# Patient Record
Sex: FEMALE | Race: WHITE | NOT HISPANIC OR LATINO | Employment: UNEMPLOYED | URBAN - METROPOLITAN AREA
[De-identification: names, ages, dates, MRNs, and addresses within clinical notes are randomized per-mention and may not be internally consistent; named-entity substitution may affect disease eponyms.]

---

## 2018-09-07 ENCOUNTER — APPOINTMENT (OUTPATIENT)
Dept: RADIOLOGY | Facility: MEDICAL CENTER | Age: 48
End: 2018-09-07
Attending: EMERGENCY MEDICINE
Payer: OTHER GOVERNMENT

## 2018-09-07 ENCOUNTER — HOSPITAL ENCOUNTER (OUTPATIENT)
Facility: MEDICAL CENTER | Age: 48
End: 2018-09-08
Attending: EMERGENCY MEDICINE | Admitting: INTERNAL MEDICINE
Payer: OTHER GOVERNMENT

## 2018-09-07 DIAGNOSIS — I95.1 ORTHOSTATIC HYPOTENSION: ICD-10-CM

## 2018-09-07 DIAGNOSIS — R55 SYNCOPE, UNSPECIFIED SYNCOPE TYPE: ICD-10-CM

## 2018-09-07 PROBLEM — E87.6 HYPOKALEMIA: Status: ACTIVE | Noted: 2018-09-07

## 2018-09-07 PROBLEM — D64.9 NORMOCYTIC ANEMIA: Status: ACTIVE | Noted: 2018-09-07

## 2018-09-07 LAB
ALBUMIN SERPL BCP-MCNC: 3.9 G/DL (ref 3.2–4.9)
ALBUMIN/GLOB SERPL: 1.1 G/DL
ALP SERPL-CCNC: 64 U/L (ref 30–99)
ALT SERPL-CCNC: 14 U/L (ref 2–50)
ANION GAP SERPL CALC-SCNC: 7 MMOL/L (ref 0–11.9)
APTT PPP: 24.2 SEC (ref 24.7–36)
AST SERPL-CCNC: 15 U/L (ref 12–45)
BASOPHILS # BLD AUTO: 0.4 % (ref 0–1.8)
BASOPHILS # BLD: 0.04 K/UL (ref 0–0.12)
BILIRUB SERPL-MCNC: 0.2 MG/DL (ref 0.1–1.5)
BUN SERPL-MCNC: 15 MG/DL (ref 8–22)
CALCIUM SERPL-MCNC: 9.1 MG/DL (ref 8.5–10.5)
CHLORIDE SERPL-SCNC: 105 MMOL/L (ref 96–112)
CO2 SERPL-SCNC: 27 MMOL/L (ref 20–33)
CREAT SERPL-MCNC: 0.63 MG/DL (ref 0.5–1.4)
EKG IMPRESSION: NORMAL
EOSINOPHIL # BLD AUTO: 0.34 K/UL (ref 0–0.51)
EOSINOPHIL NFR BLD: 3.6 % (ref 0–6.9)
ERYTHROCYTE [DISTWIDTH] IN BLOOD BY AUTOMATED COUNT: 41.3 FL (ref 35.9–50)
FERRITIN SERPL-MCNC: 14.2 NG/ML (ref 10–291)
FOLATE SERPL-MCNC: 17.2 NG/ML
GLOBULIN SER CALC-MCNC: 3.4 G/DL (ref 1.9–3.5)
GLUCOSE SERPL-MCNC: 87 MG/DL (ref 65–99)
HCG SERPL QL: NEGATIVE
HCT VFR BLD AUTO: 32.4 % (ref 37–47)
HGB BLD-MCNC: 9.9 G/DL (ref 12–16)
IMM GRANULOCYTES # BLD AUTO: 0.03 K/UL (ref 0–0.11)
IMM GRANULOCYTES NFR BLD AUTO: 0.3 % (ref 0–0.9)
INR PPP: 1.06 (ref 0.87–1.13)
IRON SATN MFR SERPL: 10 % (ref 15–55)
IRON SERPL-MCNC: 39 UG/DL (ref 40–170)
LYMPHOCYTES # BLD AUTO: 2.25 K/UL (ref 1–4.8)
LYMPHOCYTES NFR BLD: 23.5 % (ref 22–41)
MCH RBC QN AUTO: 26.5 PG (ref 27–33)
MCHC RBC AUTO-ENTMCNC: 30.6 G/DL (ref 33.6–35)
MCV RBC AUTO: 86.6 FL (ref 81.4–97.8)
MONOCYTES # BLD AUTO: 0.58 K/UL (ref 0–0.85)
MONOCYTES NFR BLD AUTO: 6.1 % (ref 0–13.4)
NEUTROPHILS # BLD AUTO: 6.32 K/UL (ref 2–7.15)
NEUTROPHILS NFR BLD: 66.1 % (ref 44–72)
NRBC # BLD AUTO: 0 K/UL
NRBC BLD-RTO: 0 /100 WBC
PLATELET # BLD AUTO: 429 K/UL (ref 164–446)
PMV BLD AUTO: 8.4 FL (ref 9–12.9)
POTASSIUM SERPL-SCNC: 3.3 MMOL/L (ref 3.6–5.5)
PROT SERPL-MCNC: 7.3 G/DL (ref 6–8.2)
PROTHROMBIN TIME: 13.5 SEC (ref 12–14.6)
RBC # BLD AUTO: 3.74 M/UL (ref 4.2–5.4)
SODIUM SERPL-SCNC: 139 MMOL/L (ref 135–145)
TIBC SERPL-MCNC: 389 UG/DL (ref 250–450)
TROPONIN I SERPL-MCNC: <0.01 NG/ML (ref 0–0.04)
TSH SERPL DL<=0.005 MIU/L-ACNC: 1.95 UIU/ML (ref 0.38–5.33)
VIT B12 SERPL-MCNC: 408 PG/ML (ref 211–911)
WBC # BLD AUTO: 9.6 K/UL (ref 4.8–10.8)

## 2018-09-07 PROCEDURE — 84443 ASSAY THYROID STIM HORMONE: CPT

## 2018-09-07 PROCEDURE — 80053 COMPREHEN METABOLIC PANEL: CPT

## 2018-09-07 PROCEDURE — 93005 ELECTROCARDIOGRAM TRACING: CPT | Performed by: EMERGENCY MEDICINE

## 2018-09-07 PROCEDURE — 82728 ASSAY OF FERRITIN: CPT

## 2018-09-07 PROCEDURE — 83540 ASSAY OF IRON: CPT

## 2018-09-07 PROCEDURE — G0378 HOSPITAL OBSERVATION PER HR: HCPCS

## 2018-09-07 PROCEDURE — 93005 ELECTROCARDIOGRAM TRACING: CPT

## 2018-09-07 PROCEDURE — 84703 CHORIONIC GONADOTROPIN ASSAY: CPT

## 2018-09-07 PROCEDURE — 85730 THROMBOPLASTIN TIME PARTIAL: CPT

## 2018-09-07 PROCEDURE — 70450 CT HEAD/BRAIN W/O DYE: CPT

## 2018-09-07 PROCEDURE — 71045 X-RAY EXAM CHEST 1 VIEW: CPT

## 2018-09-07 PROCEDURE — 82746 ASSAY OF FOLIC ACID SERUM: CPT

## 2018-09-07 PROCEDURE — 85610 PROTHROMBIN TIME: CPT

## 2018-09-07 PROCEDURE — 84484 ASSAY OF TROPONIN QUANT: CPT

## 2018-09-07 PROCEDURE — 83550 IRON BINDING TEST: CPT

## 2018-09-07 PROCEDURE — 99220 PR INITIAL OBSERVATION CARE,LEVL III: CPT | Performed by: INTERNAL MEDICINE

## 2018-09-07 PROCEDURE — 700105 HCHG RX REV CODE 258: Performed by: INTERNAL MEDICINE

## 2018-09-07 PROCEDURE — 96360 HYDRATION IV INFUSION INIT: CPT

## 2018-09-07 PROCEDURE — 82607 VITAMIN B-12: CPT

## 2018-09-07 PROCEDURE — 85025 COMPLETE CBC W/AUTO DIFF WBC: CPT

## 2018-09-07 PROCEDURE — 99285 EMERGENCY DEPT VISIT HI MDM: CPT

## 2018-09-07 PROCEDURE — 700105 HCHG RX REV CODE 258: Performed by: EMERGENCY MEDICINE

## 2018-09-07 RX ORDER — ONDANSETRON 2 MG/ML
4 INJECTION INTRAMUSCULAR; INTRAVENOUS EVERY 4 HOURS PRN
Status: DISCONTINUED | OUTPATIENT
Start: 2018-09-07 | End: 2018-09-08 | Stop reason: HOSPADM

## 2018-09-07 RX ORDER — SODIUM CHLORIDE 9 MG/ML
1000 INJECTION, SOLUTION INTRAVENOUS ONCE
Status: COMPLETED | OUTPATIENT
Start: 2018-09-07 | End: 2018-09-07

## 2018-09-07 RX ORDER — POLYETHYLENE GLYCOL 3350 17 G/17G
1 POWDER, FOR SOLUTION ORAL
Status: DISCONTINUED | OUTPATIENT
Start: 2018-09-07 | End: 2018-09-08

## 2018-09-07 RX ORDER — SODIUM CHLORIDE 9 MG/ML
INJECTION, SOLUTION INTRAVENOUS CONTINUOUS
Status: DISCONTINUED | OUTPATIENT
Start: 2018-09-07 | End: 2018-09-08 | Stop reason: HOSPADM

## 2018-09-07 RX ORDER — ACETAMINOPHEN 325 MG/1
650 TABLET ORAL EVERY 6 HOURS PRN
Status: DISCONTINUED | OUTPATIENT
Start: 2018-09-07 | End: 2018-09-08 | Stop reason: HOSPADM

## 2018-09-07 RX ORDER — BISACODYL 10 MG
10 SUPPOSITORY, RECTAL RECTAL
Status: DISCONTINUED | OUTPATIENT
Start: 2018-09-07 | End: 2018-09-08

## 2018-09-07 RX ORDER — SODIUM CHLORIDE 9 MG/ML
INJECTION, SOLUTION INTRAVENOUS CONTINUOUS
Status: DISCONTINUED | OUTPATIENT
Start: 2018-09-07 | End: 2018-09-07

## 2018-09-07 RX ORDER — PROMETHAZINE HYDROCHLORIDE 25 MG/1
12.5-25 TABLET ORAL EVERY 4 HOURS PRN
Status: DISCONTINUED | OUTPATIENT
Start: 2018-09-07 | End: 2018-09-08

## 2018-09-07 RX ORDER — IPRATROPIUM BROMIDE AND ALBUTEROL SULFATE 2.5; .5 MG/3ML; MG/3ML
3 SOLUTION RESPIRATORY (INHALATION)
Status: DISCONTINUED | OUTPATIENT
Start: 2018-09-07 | End: 2018-09-07

## 2018-09-07 RX ORDER — ONDANSETRON 4 MG/1
4 TABLET, ORALLY DISINTEGRATING ORAL EVERY 4 HOURS PRN
Status: DISCONTINUED | OUTPATIENT
Start: 2018-09-07 | End: 2018-09-08 | Stop reason: HOSPADM

## 2018-09-07 RX ORDER — PROMETHAZINE HYDROCHLORIDE 25 MG/1
12.5-25 SUPPOSITORY RECTAL EVERY 4 HOURS PRN
Status: DISCONTINUED | OUTPATIENT
Start: 2018-09-07 | End: 2018-09-08

## 2018-09-07 RX ORDER — AMOXICILLIN 250 MG
2 CAPSULE ORAL 2 TIMES DAILY
Status: DISCONTINUED | OUTPATIENT
Start: 2018-09-08 | End: 2018-09-08

## 2018-09-07 RX ADMIN — SODIUM CHLORIDE: 9 INJECTION, SOLUTION INTRAVENOUS at 20:52

## 2018-09-07 RX ADMIN — SODIUM CHLORIDE 1000 ML: 9 INJECTION, SOLUTION INTRAVENOUS at 17:00

## 2018-09-07 ASSESSMENT — PAIN SCALES - GENERAL
PAINLEVEL_OUTOF10: 0
PAINLEVEL_OUTOF10: 0

## 2018-09-07 ASSESSMENT — ENCOUNTER SYMPTOMS
LOSS OF CONSCIOUSNESS: 1
NAUSEA: 0
VOMITING: 0
COUGH: 0
STRIDOR: 0
BACK PAIN: 0
MYALGIAS: 0
HEARTBURN: 0
HEADACHES: 0
SEIZURES: 0
EYE REDNESS: 0
NERVOUS/ANXIOUS: 0
SPUTUM PRODUCTION: 0
SHORTNESS OF BREATH: 0
FEVER: 0
DIARRHEA: 0
BLURRED VISION: 0
WEIGHT LOSS: 0
EYE DISCHARGE: 0
INSOMNIA: 0
CHILLS: 0
FOCAL WEAKNESS: 0
DIZZINESS: 0
NECK PAIN: 0
ORTHOPNEA: 0
EYE PAIN: 0
PALPITATIONS: 0
ABDOMINAL PAIN: 0
DEPRESSION: 0

## 2018-09-07 ASSESSMENT — PATIENT HEALTH QUESTIONNAIRE - PHQ9
2. FEELING DOWN, DEPRESSED, IRRITABLE, OR HOPELESS: NOT AT ALL
1. LITTLE INTEREST OR PLEASURE IN DOING THINGS: NOT AT ALL
SUM OF ALL RESPONSES TO PHQ9 QUESTIONS 1 AND 2: 0
1. LITTLE INTEREST OR PLEASURE IN DOING THINGS: NOT AT ALL
2. FEELING DOWN, DEPRESSED, IRRITABLE, OR HOPELESS: NOT AT ALL
SUM OF ALL RESPONSES TO PHQ9 QUESTIONS 1 AND 2: 0

## 2018-09-07 ASSESSMENT — LIFESTYLE VARIABLES
ON A TYPICAL DAY WHEN YOU DRINK ALCOHOL HOW MANY DRINKS DO YOU HAVE: 1
HAVE PEOPLE ANNOYED YOU BY CRITICIZING YOUR DRINKING: NO
EVER HAD A DRINK FIRST THING IN THE MORNING TO STEADY YOUR NERVES TO GET RID OF A HANGOVER: NO
AVERAGE NUMBER OF DAYS PER WEEK YOU HAVE A DRINK CONTAINING ALCOHOL: 1
EVER_SMOKED: NEVER
TOTAL SCORE: 0
HAVE YOU EVER FELT YOU SHOULD CUT DOWN ON YOUR DRINKING: NO
TOTAL SCORE: 0
EVER FELT BAD OR GUILTY ABOUT YOUR DRINKING: NO
ALCOHOL_USE: YES
TOTAL SCORE: 0
HOW MANY TIMES IN THE PAST YEAR HAVE YOU HAD 5 OR MORE DRINKS IN A DAY: 0
CONSUMPTION TOTAL: NEGATIVE

## 2018-09-08 VITALS
DIASTOLIC BLOOD PRESSURE: 56 MMHG | SYSTOLIC BLOOD PRESSURE: 95 MMHG | WEIGHT: 148.37 LBS | RESPIRATION RATE: 16 BRPM | HEIGHT: 63 IN | HEART RATE: 84 BPM | OXYGEN SATURATION: 96 % | BODY MASS INDEX: 26.29 KG/M2 | TEMPERATURE: 98.2 F

## 2018-09-08 LAB
AMPHET UR QL SCN: NEGATIVE
ANION GAP SERPL CALC-SCNC: 5 MMOL/L (ref 0–11.9)
BARBITURATES UR QL SCN: NEGATIVE
BENZODIAZ UR QL SCN: NEGATIVE
BUN SERPL-MCNC: 11 MG/DL (ref 8–22)
BZE UR QL SCN: NEGATIVE
CALCIUM SERPL-MCNC: 9 MG/DL (ref 8.5–10.5)
CANNABINOIDS UR QL SCN: NEGATIVE
CHLORIDE SERPL-SCNC: 110 MMOL/L (ref 96–112)
CO2 SERPL-SCNC: 22 MMOL/L (ref 20–33)
CORTIS SERPL-MCNC: 15.7 UG/DL (ref 0–23)
CREAT SERPL-MCNC: 0.4 MG/DL (ref 0.5–1.4)
ERYTHROCYTE [DISTWIDTH] IN BLOOD BY AUTOMATED COUNT: 42.1 FL (ref 35.9–50)
GLUCOSE SERPL-MCNC: 92 MG/DL (ref 65–99)
HCT VFR BLD AUTO: 29.5 % (ref 37–47)
HGB BLD-MCNC: 9.1 G/DL (ref 12–16)
LV EJECT FRACT  99904: 60
LV EJECT FRACT MOD 2C 99903: 67.05
LV EJECT FRACT MOD 4C 99902: 52.88
LV EJECT FRACT MOD BP 99901: 61.46
MCH RBC QN AUTO: 26.8 PG (ref 27–33)
MCHC RBC AUTO-ENTMCNC: 30.8 G/DL (ref 33.6–35)
MCV RBC AUTO: 86.8 FL (ref 81.4–97.8)
METHADONE UR QL SCN: NEGATIVE
OPIATES UR QL SCN: NEGATIVE
OXYCODONE UR QL SCN: NEGATIVE
PCP UR QL SCN: NEGATIVE
PLATELET # BLD AUTO: 382 K/UL (ref 164–446)
PMV BLD AUTO: 8.4 FL (ref 9–12.9)
POTASSIUM SERPL-SCNC: 3.8 MMOL/L (ref 3.6–5.5)
PROPOXYPH UR QL SCN: NEGATIVE
RBC # BLD AUTO: 3.4 M/UL (ref 4.2–5.4)
SODIUM SERPL-SCNC: 137 MMOL/L (ref 135–145)
TROPONIN I SERPL-MCNC: <0.01 NG/ML (ref 0–0.04)
WBC # BLD AUTO: 10 K/UL (ref 4.8–10.8)

## 2018-09-08 PROCEDURE — 80307 DRUG TEST PRSMV CHEM ANLYZR: CPT

## 2018-09-08 PROCEDURE — 84484 ASSAY OF TROPONIN QUANT: CPT

## 2018-09-08 PROCEDURE — 99217 PR OBSERVATION CARE DISCHARGE: CPT | Performed by: INTERNAL MEDICINE

## 2018-09-08 PROCEDURE — 36415 COLL VENOUS BLD VENIPUNCTURE: CPT

## 2018-09-08 PROCEDURE — 93306 TTE W/DOPPLER COMPLETE: CPT

## 2018-09-08 PROCEDURE — 93306 TTE W/DOPPLER COMPLETE: CPT | Mod: 26 | Performed by: INTERNAL MEDICINE

## 2018-09-08 PROCEDURE — 82533 TOTAL CORTISOL: CPT

## 2018-09-08 PROCEDURE — 85027 COMPLETE CBC AUTOMATED: CPT

## 2018-09-08 PROCEDURE — G0378 HOSPITAL OBSERVATION PER HR: HCPCS

## 2018-09-08 PROCEDURE — 80048 BASIC METABOLIC PNL TOTAL CA: CPT

## 2018-09-08 ASSESSMENT — PAIN SCALES - GENERAL
PAINLEVEL_OUTOF10: 0

## 2018-09-08 NOTE — H&P
Hospital Medicine History and Physical      Date of Service  9/7/2018    Chief Complaint  Chief Complaint   Patient presents with   • Syncope     pt biba from [facility], had witnessed syncopal episodevx 2.   assisted to ground, no trauma.  A&O x4, GSC 15, positve orthostatic VS per EMS       History of Presenting Illness  Magdaleno is a 48 y.o. Female with, who presents with syncope episodes for 2 times earlier today at the hotel.  [place]  She stated that she had 2 drinks of alcohol today.  She said that she felt dizzy and palpitation before she had syncope episode.  Patient denies any chest pain.  When ambulance arrived she was found to be hypotensive with blood pressure 60 over 40s.  She was started on IV fluid and brought to ER.  In the ER she has CT scan of the head done with no acute finding.  She denied tongue biting or urinary or bowel incontinence.  Currently her vitals are stable.  She will be admitted for observation.    Primary Care Physician  No primary care provider on file.      Code Status  Full code    Review of Systems  Review of Systems   Constitutional: Negative for chills, fever and weight loss.   HENT: Negative for congestion and nosebleeds.    Eyes: Negative for blurred vision, pain, discharge and redness.   Respiratory: Negative for cough, sputum production, shortness of breath and stridor.    Cardiovascular: Negative for chest pain, palpitations and orthopnea.   Gastrointestinal: Negative for abdominal pain, diarrhea, heartburn, nausea and vomiting.   Genitourinary: Negative for dysuria, frequency and urgency.   Musculoskeletal: Negative for back pain, myalgias and neck pain.   Skin: Negative for itching and rash.   Neurological: Positive for loss of consciousness. Negative for dizziness, focal weakness, seizures and headaches.   Psychiatric/Behavioral: Negative for depression. The patient is not nervous/anxious and does not have insomnia.      Please  see HPI, all other systems were reviewed and are negative (AMA/CMS criteria)     Past Medical History  No pertinent PMH    Surgical History  No pertinent PSH    Medications  Not taking any medications  Family History  History reviewed. No pertinent family history.      Social History  Social History   Substance Use Topics   • Smoking status: Never Smoker   • Smokeless tobacco: Never Used   • Alcohol use Yes      Comment: rarely       Allergies  No Known Allergies     Physical Exam  Laboratory   Hemodynamics  Temp (24hrs), Av.4 °C (97.6 °F), Min:36.4 °C (97.6 °F), Max:36.4 °C (97.6 °F)   Temperature: 36.4 °C (97.6 °F)  Pulse  Av.3  Min: 87  Max: 92 Heart Rate (Monitored): 93  Blood Pressure: 112/61, NIBP: 110/57      Respiratory      Respiration: 16, Pulse Oximetry: 97 %             Physical Exam   Constitutional: She is oriented to person, place, and time. No distress.   HENT:   Head: Normocephalic and atraumatic.   Mouth/Throat: Oropharynx is clear and moist.   Eyes: Pupils are equal, round, and reactive to light. Conjunctivae and EOM are normal.   Neck: Normal range of motion. Neck supple. No tracheal deviation present. No thyromegaly present.   Cardiovascular: Normal rate and regular rhythm.    No murmur heard.  Pulmonary/Chest: Effort normal and breath sounds normal. No respiratory distress. She has no wheezes.   Abdominal: Soft. Bowel sounds are normal. She exhibits no distension. There is no tenderness.   Musculoskeletal: She exhibits no edema or tenderness.   Neurological: She is alert and oriented to person, place, and time. No cranial nerve deficit.   Skin: Skin is warm and dry. She is not diaphoretic. No erythema.   Psychiatric: She has a normal mood and affect. Her behavior is normal. Thought content normal.       Recent Labs      18   1707   WBC  9.6   RBC  3.74*   HEMOGLOBIN  9.9*   HEMATOCRIT  32.4*   MCV  86.6   MCH  26.5*   MCHC  30.6*   RDW  41.3   PLATELETCT  429   MPV  8.4*      Recent Labs      09/07/18   1707   SODIUM  139   POTASSIUM  3.3*   CHLORIDE  105   CO2  27   GLUCOSE  87   BUN  15   CREATININE  0.63   CALCIUM  9.1     Recent Labs      09/07/18   1707   ALTSGPT  14   ASTSGOT  15   ALKPHOSPHAT  64   TBILIRUBIN  0.2   GLUCOSE  87     Recent Labs      09/07/18   1707   APTT  24.2*   INR  1.06             Lab Results   Component Value Date    TROPONINI <0.01 09/07/2018       Imaging  CT-HEAD W/O   Final Result      Head CT without contrast within normal limits. No evidence of acute cerebral infarction, hemorrhage or mass lesion.      DX-CHEST-PORTABLE (1 VIEW)   Final Result      No acute cardiac or pulmonary abnormalities are identified. Lung volumes are low.      Echocardiogram Comp W/O Cont    (Results Pending)     EKG  per my independant read:  QTc: 455, HR: 86, Normal Sinus Rhythm, no ST/T changes     Assessment/Plan     I anticipate this patient is appropriate for observation status at this time.    Syncope- (present on admission)   Assessment & Plan    Likely related to hypotension from dehydration  CT head negative  On IVF  Check echo        Normocytic anemia- (present on admission)   Assessment & Plan    Not sure about her baseline  Check iron, b12, folate, tsh        Hypokalemia- (present on admission)   Assessment & Plan    Replete as needed            Prophylaxis:  lovenox

## 2018-09-08 NOTE — PROGRESS NOTES
Answered patient's call light.  Patient requested to use restroom.  Patient walked to restroom with steady gait.  Patient back to bed.  Call light within reach, no other needs at this time.

## 2018-09-08 NOTE — DISCHARGE SUMMARY
Discharge Summary    CHIEF COMPLAINT ON ADMISSION  Chief Complaint   Patient presents with   • Syncope     pt biba from [facility], had witnessed syncopal episodevx 2.   assisted to ground, no trauma.  A&O x4, GSC 15, positve orthostatic VS per EMS       Reason for Admission  EMS     Admission Date  9/7/2018    CODE STATUS  Full Code    HPI & HOSPITAL COURSE  This is a 48 y.o. female here with Syncope.  She was admitted to the hospital for observation.  Her syncope is believed to be secondary to orthostatic hypotension.  She was monitored on telemetry and no events were noted.  Troponins were cycled and found to be negative.  Negative chest x-ray on presentation.  CT of the head on presentation negative for any acute intracranial abnormalities.  Echocardiogram obtained during the hospital stay does not reveal any acute concerns.  Normal cortisol levels.  Prior to presentation, patient was noted to be hypotensive in the field with systolic blood pressure of 50-60 which was responsive to fluid.  Evaluated by me on September 8, 2018.  She feels her baseline, she is eager to be discharged home at this point in time.  She does not have any signs or symptoms suggestive of an underlying infectious process.  She is advised to follow-up with her primary care physician within 1 week of hospital discharge.  The patient is noted to have normocytic anemia attributable to iron deficiency anemia, she declines any GI related blood losses, potential for GYN related blood losses.  Reports prior history of iron deficiency anemia and intolerance to oral iron.  Recommend follow-up with her primary care physician within 1 week, discussed with the patient and advised her we discussed with the PCP further evaluation and management of her an underlying anemia.  No evidence of acute bleeding during hospitalization, worsening anemia with dilutional related to IV fluid hydration during the hospital stay.        Therefore, she is discharged in  good and stable condition to home with close outpatient follow-up.    Discharge Date  09/08/18    FOLLOW UP ITEMS POST DISCHARGE  F/U PCP within one week of hospital discharge     DISCHARGE DIAGNOSES  Active Problems:    Syncope POA: Yes    Hypokalemia POA: Yes    Normocytic anemia POA: Yes  Resolved Problems:    * No resolved hospital problems. *      FOLLOW UP  No future appointments.        Follow up with Primary Care MD back home if needed.       MEDICATIONS ON DISCHARGE     Medication List      You have not been prescribed any medications.         Allergies  No Known Allergies    DIET  Orders Placed This Encounter   Procedures   • Diet Order Regular     Standing Status:   Standing     Number of Occurrences:   1     Order Specific Question:   Diet:     Answer:   Regular [1]       ACTIVITY  As tolerated.  Weight bearing as tolerated    CONSULTATIONS  None    PROCEDURES  None    LABORATORY  Lab Results   Component Value Date    SODIUM 137 09/08/2018    POTASSIUM 3.8 09/08/2018    CHLORIDE 110 09/08/2018    CO2 22 09/08/2018    GLUCOSE 92 09/08/2018    BUN 11 09/08/2018    CREATININE 0.40 (L) 09/08/2018        Lab Results   Component Value Date    WBC 10.0 09/08/2018    HEMOGLOBIN 9.1 (L) 09/08/2018    HEMATOCRIT 29.5 (L) 09/08/2018    PLATELETCT 382 09/08/2018

## 2018-09-08 NOTE — ED TRIAGE NOTES
Melina Dolan  Chief Complaint   Patient presents with   • Syncope     pt biba from [facility], had witnessed syncopal episodevx 2.   assisted to ground, no trauma.  A&O x4, GSC 15, positve orthostatic VS per EMS     In gown, on monitor.  A&O x4.  GCS 15.  BP 86/56 PTA.  FSBS 95 per EMS. Denies pain or trauma.    PIV placed PTA with 250cc IVF given..  Chart up for ERP

## 2018-09-08 NOTE — PROGRESS NOTES
Bedside report received 0700. POC discussed with pt; negative for dizziness and weakness throughout NOC; No overnight cardiac events; Pending echo results; Pt verbalizes improvement in health; all questions answered at this time.

## 2018-09-08 NOTE — PROGRESS NOTES
A/o,denies dizziness,ambulatory in room, pt wants to take a shower, Dr. Ananda schwartz, orders received ,ok for shower,  at bedside, helping pt in shower.

## 2018-09-08 NOTE — DISCHARGE INSTRUCTIONS
Discharge Instructions    Discharged to home by car with relative. Discharged via wheelchair, hospital escort: Yes.  Special equipment needed: Not Applicable    Be sure to schedule a follow-up appointment with your primary care doctor or any specialists as instructed.     Discharge Plan:   Diet Plan: Discussed  Activity Level: Discussed  Confirmed Follow up Appointment: Appointment Scheduled  Confirmed Symptoms Management: Discussed  Medication Reconciliation Updated: Yes  Influenza Vaccine Indication: Patient Refuses    I understand that a diet low in cholesterol, fat, and sodium is recommended for good health. Unless I have been given specific instructions below for another diet, I accept this instruction as my diet prescription.   Other diet: Heart Healthy     Special Instructions: Follow up with primary MD regarding Iron needs/anemia.   · Is patient discharged on Warfarin / Coumadin?   No     Depression / Suicide Risk    As you are discharged from this [facility] facility, it is important to learn how to keep safe from harming yourself.    Recognize the warning signs:  · Abrupt changes in personality, positive or negative- including increase in energy   · Giving away possessions  · Change in eating patterns- significant weight changes-  positive or negative  · Change in sleeping patterns- unable to sleep or sleeping all the time   · Unwillingness or inability to communicate  · Depression  · Unusual sadness, discouragement and loneliness  · Talk of wanting to die  · Neglect of personal appearance   · Rebelliousness- reckless behavior  · Withdrawal from people/activities they love  · Confusion- inability to concentrate     If you or a loved one observes any of these behaviors or has concerns about self-harm, here's what you can do:  · Talk about it- your feelings and reasons for harming yourself  · Remove any means that you might use to hurt yourself (examples: pills, rope, extension cords, firearm)  · Get  professional help from the community (Mental Health, Substance Abuse, psychological counseling)  · Do not be alone:Call your Safe Contact- someone whom you trust who will be there for you.  · Call your local CRISIS HOTLINE 421-3858 or 054-406-7514  · Call your local Children's Mobile Crisis Response Team [place] (012) 385-4630 or www.[employer]idyana.com  · Call the toll free National Suicide Prevention Hotlines   · National Suicide Prevention Lifeline 023-269-NKFC (2176)  · Single[employer] Line Network 800-SUICIDE (125-1780)        Syncope  Introduction  Syncope is when you lose temporarily pass out (faint). Signs that you may be about to pass out include:  · Feeling dizzy or light-headed.  · Feeling sick to your stomach (nauseous).  · Seeing all white or all black.  · Having cold, clammy skin.  If you passed out, get help right away. Call your local emergency services (911 in the [place] Do not drive yourself to the hospital.  Follow these instructions at home:  Pay attention to any changes in your symptoms. Take these actions to help with your condition:  · Have someone stay with you until you feel stable.  · Do not drive, use machinery, or play sports until your doctor says it is okay.  · Keep all follow-up visits as told by your doctor. This is important.  · If you start to feel like you might pass out, lie down right away and raise (elevate) your feet above the level of your heart. Breathe deeply and steadily. Wait until all of the symptoms are gone.  · Drink enough fluid to keep your pee (urine) clear or pale yellow.  · If you are taking blood pressure or heart medicine, get up slowly and spend many minutes getting ready to sit and then stand. This can help with dizziness.  · Take over-the-counter and prescription medicines only as told by your doctor.  Get help right away if:  · You have a very bad headache.  · You have unusual pain in your chest, tummy, or back.  · You are bleeding from your mouth or  rectum.  · You have black or tarry poop (stool).  · You have a very fast or uneven heartbeat (palpitations).  · It hurts to breathe.  · You pass out once or more than once.  · You have jerky movements that you cannot control (seizure).  · You are confused.  · You have trouble walking.  · You are very weak.  · You have vision problems.  These symptoms may be an emergency. Do not wait to see if the symptoms will go away. Get medical help right away. Call your local emergency services (911 in the [place] Do not drive yourself to the hospital.   This information is not intended to replace advice given to you by your health care provider. Make sure you discuss any questions you have with your health care provider.  Document Released: 06/05/2009 Document Revised: 05/25/2017 Document Reviewed: 08/31/2016  © 2017 Elsevier        Dehydration, Adult  Dehydration is when there is not enough fluid or water in your body. This happens when you lose more fluids than you take in. Dehydration can range from mild to very bad. It should be treated right away to keep it from getting very bad.  Symptoms of mild dehydration may include:  · Thirst.  · Dry lips.  · Slightly dry mouth.  · Dry, warm skin.  · Dizziness.  Symptoms of moderate dehydration may include:  · Very dry mouth.  · Muscle cramps.  · Dark pee (urine). Pee may be the color of tea.  · Your body making less pee.  · Your eyes making fewer tears.  · Heartbeat that is uneven or faster than normal (palpitations).  · Headache.  · Light-headedness, especially when you stand up from sitting.  · Fainting (syncope).  Symptoms of very bad dehydration may include:  · Changes in skin, such as:  ¨ Cold and clammy skin.  ¨ Blotchy (mottled) or pale skin.  ¨ Skin that does not quickly return to normal after being lightly pinched and let go (poor skin turgor).  · Changes in body fluids, such as:  ¨ Feeling very thirsty.  ¨ Your eyes making fewer tears.  ¨ Not sweating when body temperature  is high, such as in hot weather.  ¨ Your body making very little pee.  · Changes in vital signs, such as:  ¨ Weak pulse.  ¨ Pulse that is more than 100 beats a minute when you are sitting still.  ¨ Fast breathing.  ¨ Low blood pressure.  · Other changes, such as:  ¨ Sunken eyes.  ¨ Cold hands and feet.  ¨ Confusion.  ¨ Lack of energy (lethargy).  ¨ Trouble waking up from sleep.  ¨ Short-term weight loss.  ¨ Unconsciousness.  Follow these instructions at home:  · If told by your doctor, drink an ORS:  ¨ Make an ORS by using instructions on the package.  ¨ Start by drinking small amounts, about ½ cup (120 mL) every 5-10 minutes.  ¨ Slowly drink more until you have had the amount that your doctor said to have.  · Drink enough clear fluid to keep your pee clear or pale yellow. If you were told to drink an ORS, finish the ORS first, then start slowly drinking clear fluids. Drink fluids such as:  ¨ Water. Do not drink only water by itself. Doing that can make the salt (sodium) level in your body get too low (hyponatremia).  ¨ Ice chips.  ¨ Fruit juice that you have added water to (diluted).  ¨ Low-calorie sports drinks.  · Avoid:  ¨ Alcohol.  ¨ Drinks that have a lot of sugar. These include high-calorie sports drinks, fruit juice that does not have water added, and soda.  ¨ Caffeine.  ¨ Foods that are greasy or have a lot of fat or sugar.  · Take over-the-counter and prescription medicines only as told by your doctor.  · Do not take salt tablets. Doing that can make the salt level in your body get too high (hypernatremia).  · Eat foods that have minerals (electrolytes). Examples include bananas, oranges, potatoes, tomatoes, and spinach.  · Keep all follow-up visits as told by your doctor. This is important.  Contact a doctor if:  · You have belly (abdominal) pain that:  ¨ Gets worse.  ¨ Stays in one area (localizes).  · You have a rash.  · You have a stiff neck.  · You get angry or annoyed more easily than normal  (irritability).  · You are more sleepy than normal.  · You have a harder time waking up than normal.  · You feel:  ¨ Weak.  ¨ Dizzy.  ¨ Very thirsty.  · You have peed (urinated) only a small amount of very dark pee during 6-8 hours.  Get help right away if:  · You have symptoms of very bad dehydration.  · You cannot drink fluids without throwing up (vomiting).  · Your symptoms get worse with treatment.  · You have a fever.  · You have a very bad headache.  · You are throwing up or having watery poop (diarrhea) and it:  ¨ Gets worse.  ¨ Does not go away.  · You have blood or something green (bile) in your throw-up.  · You have blood in your poop (stool). This may cause poop to look black and tarry.  · You have not peed in 6-8 hours.  · You pass out (faint).  · Your heart rate when you are sitting still is more than 100 beats a minute.  · You have trouble breathing.  This information is not intended to replace advice given to you by your health care provider. Make sure you discuss any questions you have with your health care provider.  Document Released: 10/14/2010 Document Revised: 07/07/2017 Document Reviewed: 02/10/2017  Elsevier Interactive Patient Education © 2017 [employer]

## 2018-09-08 NOTE — PROGRESS NOTES
Pt back to bed after shower, hot meal given,tolerated,assessment completed,poc discussed,verbalized understanding, denies dizziness,sob,pain ,n/t, ST on tele hr=92,no ectopy, c/o pain on iv site, removed per pt request, new iv placed right FA 22g,call button within reach,will continue to monitor.

## 2018-09-08 NOTE — ED PROVIDER NOTES
"ED Provider Note    CHIEF COMPLAINT  Chief Complaint   Patient presents with   • Syncope     pt biba from [facility], had witnessed syncopal episodevx 2.   assisted to ground, no trauma.  A&O x4, GSC 15, positve orthostatic VS per EMS       HPI  Melina Dolan is a 48 y.o. female who presents after passing out.  The patient and her  arrived late last night from [place].  They drove.  They are here on holiday.  They are out by the pool, she feels so good, feeling hot, stood up to go inside walked about 20 feet and got very lightheaded.  She was assisted down to the ground.  Felt like she was doing better but she wanted to get inside.  She stood up, and was able to walk another 100 yards or so when she felt lightheaded again, again held down.  Again try to get up and then ended up completely collapsing, and  describes her being completely out of it.  EMS was called, and they did check some orthostatics, she had a pressure of 60 upon getting upright.  The patient for her part right after getting some IV fluids feels improved.  She feels her stomach is \"a little off\" and she has \"a little\" pain in her right temple.  Denies any neck pain.  There is no chest pain or shortness of breath.  Again no breathing difficulty at all.  No abdominal pain.  No leg pain or swelling.  No change in bowel or bladder.  They have been eating and drinking through the day, perhaps not as much as typical.  No recent illness.  There is no other complaint.    PAST MEDICAL HISTORY  None, she is healthy    FAMILY HISTORY  History reviewed. No pertinent family history.    SOCIAL HISTORY  Social History   Substance Use Topics   • Smoking status: Never Smoker   • Smokeless tobacco: Never Used   • Alcohol use Yes      Comment: rarely         SURGICAL HISTORY  History reviewed. No pertinent surgical history.    CURRENT MEDICATIONS  Home Medications     Reviewed by Gely Kramer R.N. (Registered Nurse) on 09/07/18 at 1708  Med " "List Status: Complete   Medication Last Dose Status        Patient Shiva Taking any Medications                       I have reviewed the nurses notes and/or the list brought with the patient.    ALLERGIES  No Known Allergies    REVIEW OF SYSTEMS  See HPI for further details. Review of systems as above, otherwise all other systems are negative.     PHYSICAL EXAM  VITAL SIGNS: /61   Pulse 89   Temp 36.4 °C (97.6 °F)   Resp 18   Ht 1.6 m (5' 3\")   Wt 64.4 kg (142 lb)   SpO2 95%   BMI 25.15 kg/m² Orthostatics documented in the field.  Constitutional: Well appearing patient in no acute distress.  Not toxic, nor ill in appearance.  HENT: Mucus membranes moist.  Oropharynx is clear.  Her head is atraumatic.  Eyes: Pupils equally round.  No scleral icterus.   Neck: Full nontender range of motion.  Lymphatic: No cervical lymphadenopathy noted.   Cardiovascular: Regular heart rate and rhythm.  No murmurs, rubs, nor gallop appreciated.   Thorax & Lungs: Chest is nontender.  Lungs are clear to auscultation with good air movement bilaterally.  No wheeze, rhonchi, nor rales.   Abdomen: Soft, with no tenderness, rebound nor guarding.  No mass, pulsatile mass, nor hepatosplenomegaly appreciated.  Skin: No purpura nor petechia noted.  Extremities/Musculoskeletal: No sign of trauma.  Calves are nontender with no cords nor edema.  No Derek's sign.  Pulses are intact all around.   Neurologic: Alert & oriented.  Strength and sensation is intact all around.    Psychiatric: Normal affect appropriate for the clinical situation.    EKG  I interpreted this EKG myself.  This is a 12-lead study.  The rhythm is sinus with a rate of 86.  There are no ST segment nor T wave abnormalities.  Interpretation: No ST segment elevation myocardial infarction.    LABS  Labs Reviewed   CBC WITH DIFFERENTIAL - Abnormal; Notable for the following:        Result Value    RBC 3.74 (*)     Hemoglobin 9.9 (*)     Hematocrit 32.4 (*)     MCH 26.5 (*) "     MCHC 30.6 (*)     MPV 8.4 (*)     All other components within normal limits    Narrative:     Indicate which anticoagulants the patient is on:->UNKNOWN   COMP METABOLIC PANEL - Abnormal; Notable for the following:     Potassium 3.3 (*)     All other components within normal limits    Narrative:     Indicate which anticoagulants the patient is on:->UNKNOWN   APTT - Abnormal; Notable for the following:     APTT 24.2 (*)     All other components within normal limits    Narrative:     Indicate which anticoagulants the patient is on:->UNKNOWN   TROPONIN    Narrative:     Indicate which anticoagulants the patient is on:->UNKNOWN   PROTHROMBIN TIME    Narrative:     Indicate which anticoagulants the patient is on:->UNKNOWN   HCG QUAL SERUM    Narrative:     Indicate which anticoagulants the patient is on:->UNKNOWN   ESTIMATED GFR    Narrative:     Indicate which anticoagulants the patient is on:->UNKNOWN         RADIOLOGY/PROCEDURES  I have reviewed the patient's film interpretations myself, and they are read out by the radiologist as:   CT-HEAD W/O   Final Result      Head CT without contrast within normal limits. No evidence of acute cerebral infarction, hemorrhage or mass lesion.      DX-CHEST-PORTABLE (1 VIEW)   Final Result      No acute cardiac or pulmonary abnormalities are identified. Lung volumes are low.      Echocardiogram Comp W/O Cont    (Results Pending)     .    MEDICAL RECORD  I have reviewed patient's medical record and pertinent results are listed above.    COURSE & MEDICAL DECISION MAKING  I have reviewed any medical record information, laboratory studies and radiographic results as noted above.  This patient presents after having near syncope and then syncope.  She had documented hypotension.  This sounds to be orthostatic.  She has no chest pain or shortness of breath make me suspect pulmonary embolism nor findings of DVT.  IV fluids were given here for hypotension, she is feeling improved after  this, we will continue this.  Head CT was obtained because of that temporal pain, there is no evidence of subarachnoid hemorrhage.  No other incidental abnormality.  At this point, I wanted to be worthwhile to admit the patient to hospital specifically for observation, ongoing fluids and echocardiogram.  I discussed the patient's case with Dr. Malave, he will be admitting the patient.  The patient and her  are updated the plan and are agreeable to it.      FINAL IMPRESSION  1. Syncope, unspecified syncope type    2. Orthostatic hypotension           This dictation was created using voice recognition software.    Electronically signed by: Rodger Chance, 9/7/2018 5:28 PM

## 2023-03-09 ENCOUNTER — OFFICE (OUTPATIENT)
Dept: URBAN - METROPOLITAN AREA CLINIC 95 | Facility: CLINIC | Age: 53
End: 2023-03-09

## 2023-03-09 VITALS
HEIGHT: 63 IN | DIASTOLIC BLOOD PRESSURE: 76 MMHG | SYSTOLIC BLOOD PRESSURE: 119 MMHG | WEIGHT: 156 LBS | TEMPERATURE: 97.3 F | HEART RATE: 80 BPM

## 2023-03-09 DIAGNOSIS — Z12.11 SCREENING FOR COLON CANCER: ICD-10-CM

## 2023-03-09 DIAGNOSIS — K59.00 CONSTIPATION: ICD-10-CM

## 2023-03-09 DIAGNOSIS — R14.0 ABDOMINAL BLOATING: ICD-10-CM

## 2023-03-09 PROCEDURE — 99204 OFFICE O/P NEW MOD 45 MIN: CPT | Performed by: INTERNAL MEDICINE

## 2023-03-09 NOTE — SERVICEHPINOTES
I had the pleasure of seeing your patient in the office today for an initial office visit. She is a 52-year-old female with no past significant history who presents for colon cancer screening. There is no family history of colon polyps, colon cancer, celiac disease or inflammatory bowel disease. She has not previously undergone a lower gastrointestinal tract evaluation.Her appetite is intact. Her weight is stable but does fluctuate 5 to 10 pounds. She is not experiencing nausea, vomiting, regurgitation, dysphagia, jaundice, fevers or pruritus. She has rare episodes of heartburn. There is a five year history of intermittent abdominal bloating and occasional constipation. The bloating does seem to improve with defecation. There is no diarrhea, rectal bleeding, melena or mucus. She typically moves her bowels on a daily basis. She did try MiraLAX for several weeks but is unsure if this provided her with improvement. She is taking probiotics.She has not had recent laboratory studies.She does not smoke cigarettes or use cannabinoid substances. She consumes alcohol infrequently. There is no history of frequent aspirin or nonsteroidal anti-inflammatory medication use.

## 2023-08-03 ENCOUNTER — OFFICE (OUTPATIENT)
Dept: URBAN - METROPOLITAN AREA CLINIC 95 | Facility: CLINIC | Age: 53
End: 2023-08-03

## 2023-08-03 VITALS
HEART RATE: 78 BPM | DIASTOLIC BLOOD PRESSURE: 80 MMHG | SYSTOLIC BLOOD PRESSURE: 136 MMHG | WEIGHT: 154 LBS | HEIGHT: 63 IN

## 2023-08-03 DIAGNOSIS — R74.8 ELEVATED LIVER ENZYMES: ICD-10-CM

## 2023-08-03 DIAGNOSIS — E78.00 HYPERCHOLESTEREMIA: ICD-10-CM

## 2023-08-03 DIAGNOSIS — R14.0 ABDOMINAL BLOATING: ICD-10-CM

## 2023-08-03 DIAGNOSIS — K59.00 CONSTIPATION: ICD-10-CM

## 2023-08-03 PROCEDURE — 99214 OFFICE O/P EST MOD 30 MIN: CPT | Performed by: INTERNAL MEDICINE

## 2023-08-03 RX ORDER — MESALAMINE 1.2 G/1
TABLET, DELAYED RELEASE ORAL
Qty: 360 | Status: ACTIVE

## 2023-10-26 ENCOUNTER — OFFICE (OUTPATIENT)
Dept: URBAN - METROPOLITAN AREA CLINIC 95 | Facility: CLINIC | Age: 53
End: 2023-10-26

## 2023-10-26 VITALS
DIASTOLIC BLOOD PRESSURE: 76 MMHG | WEIGHT: 151 LBS | SYSTOLIC BLOOD PRESSURE: 121 MMHG | HEART RATE: 88 BPM | HEIGHT: 63 IN

## 2023-10-26 DIAGNOSIS — R74.8 ELEVATED LIVER ENZYMES: ICD-10-CM

## 2023-10-26 DIAGNOSIS — K59.00 CONSTIPATION: ICD-10-CM

## 2023-10-26 DIAGNOSIS — E78.00 HYPERCHOLESTEREMIA: ICD-10-CM

## 2023-10-26 DIAGNOSIS — R14.0 ABDOMINAL BLOATING: ICD-10-CM

## 2023-10-26 PROCEDURE — 99214 OFFICE O/P EST MOD 30 MIN: CPT | Performed by: INTERNAL MEDICINE

## 2023-11-07 ENCOUNTER — OFFICE (OUTPATIENT)
Dept: URBAN - METROPOLITAN AREA CLINIC 95 | Facility: CLINIC | Age: 53
End: 2023-11-07

## 2023-11-07 VITALS — HEIGHT: 63 IN

## 2024-01-09 ENCOUNTER — OFFICE (OUTPATIENT)
Dept: URBAN - METROPOLITAN AREA CLINIC 95 | Facility: CLINIC | Age: 54
End: 2024-01-09

## 2024-01-09 VITALS
HEART RATE: 89 BPM | WEIGHT: 155 LBS | HEIGHT: 63 IN | SYSTOLIC BLOOD PRESSURE: 128 MMHG | DIASTOLIC BLOOD PRESSURE: 84 MMHG

## 2024-01-09 DIAGNOSIS — K76.0 FATTY (CHANGE OF) LIVER, NOT ELSEWHERE CLASSIFIED: ICD-10-CM

## 2024-01-09 DIAGNOSIS — E78.00 HYPERCHOLESTEREMIA: ICD-10-CM

## 2024-01-09 DIAGNOSIS — K59.00 CONSTIPATION: ICD-10-CM

## 2024-01-09 DIAGNOSIS — R14.0 ABDOMINAL BLOATING: ICD-10-CM

## 2024-01-09 DIAGNOSIS — R19.7 DIARRHEA (UNSPECIFIED): ICD-10-CM

## 2024-01-09 DIAGNOSIS — R74.8 ELEVATED LIVER ENZYMES: ICD-10-CM

## 2024-01-09 PROCEDURE — 99214 OFFICE O/P EST MOD 30 MIN: CPT | Performed by: INTERNAL MEDICINE

## 2024-01-09 RX ORDER — RIFAXIMIN 550 MG/1
1650 TABLET ORAL
Qty: 42 | Status: ACTIVE
Start: 2024-01-09 | End: 2024-01-23

## 2024-01-09 RX ORDER — NEOMYCIN SULFATE 500 MG/1
1000 TABLET ORAL
Qty: 28 | Status: ACTIVE
Start: 2024-01-09 | End: 2024-01-23

## 2024-01-09 NOTE — SERVICEHPINOTES
3/9/23: I had the pleasure of seeing your patient in the office today for an initial office visit. She is a 52-year-old female with no past significant history who presents for colon cancer screening. There is no family history of colon polyps, colon cancer, celiac disease or inflammatory bowel disease. She has not previously undergone a lower gastrointestinal tract evaluation.Her appetite is intact. Her weight is stable but does fluctuate 5 to 10 pounds. She is not experiencing nausea, vomiting, regurgitation, dysphagia, jaundice, fevers or pruritus. She has rare episodes of heartburn. There is a five year history of intermittent abdominal bloating and occasional constipation. The bloating does seem to improve with defecation. There is no diarrhea, rectal bleeding, melena or mucus. She typically moves her bowels on a daily basis. She did try MiraLAX for several weeks but is unsure if this provided her with improvement. She is taking probiotics.She has not had recent laboratory studies.She does not smoke cigarettes or use cannabinoid substances. She consumes alcohol infrequently. There is no history of frequent aspirin or nonsteroidal anti-inflammatory medication use.8/3/23: I had the pleasure of seeing your patient in the office today for a follow-up visit. Her initial visit was performed in the office on March 9, 2023.Laboratory studies for March 30, 2023 included a normal C-reactive protein, serum immunoglobulin A level, celiac screen, TSH, CBC and comprehensive metabolic panel with the exception of an elevated ALT 42. Cholesterol was elevated at 216. Triglyceride level was normal at 111.The patient underwent a colonoscopy with Dr. Pardo on July 11, 2023. This examination to the level of the terminal ileum reported a normal terminal ileum. She described an area with "linear ulceration" in the transverse colon with pathology showing active colitis with acute inflammation and cryptitis. There is also noted to be perhaps some mild inflammation around the region of the ileocecal valve.There is no family history of inflammatory bowel disease. She denies the frequent use of aspirin or nonsteroidal anti-inflammatory medications.At the current time she is moving her bowels on a regular basis without constipation or bloating. There is no nausea, vomiting, heartburn, regurgitation, dysphagia, abdominal pain, diarrhea, rectal bleeding, melena, mucousy or extra intestinal manifestations of inflammatory bowel disease.She consumes alcohol infrequently.br10/26/23: I had the pleasure of seeing your patient in the office today for a follow-up visit. She was last seen in the office on August 3, 2023.She is currently taking mesalamine 4.8 g once a day.Abdominal ultrasound from September 18, 2023 reported increased echogenicity of the liver consistent with hepatic steatosis. There is a 2 mm equivocal non-shadowing calculus versus artifact in the right kidney.Fecal calprotectin level on September 19, 2023 was normal at 13.Laboratory studies from September 12, 2023 showed normalization of her liver function tests with a total bilirubin 0.3, direct bilirubin 0.1, AST 22, ALT 22, alkaline phosphatase 64 and albumin 4.6. Additional laboratory studies included a normal INR, creatine kinase, serum immunoglobulin G, serum immunoglobulin M, serum immunoglobulin A, ferritin (45), iron saturation (28%), alpha-1 antitrypsin level, serum ceruloplasmin, antinuclear antibody, antimitochondrial antibody and liver kidney microsomal antibody. Anti-smooth muscle antibody was mildly elevated at 1 to 80.Hepatitis A total antibody was reactive. Hepatitis B surface antigen, hepatitis B core total antibody, hepatitis B surface antibody and hepatitis C antibody were nonreactive.Recall that a previous CBC, C-reactive protein, TSH and celiac screen was normal in March 2023 with a triglyceride level of 111 and elevated cholesterol 216.At the time of her last visit I recommended a lactulose breath test and capsule endoscopy. She has not yet completed the lactulose breath test but intends on completing the study in the near future. We are awaiting authorization for the capsule endoscopy.She is typically moving her bowels three times a day with soft stools without constipation, diarrhea, bloating, rectal bleeding, melena, mucus, oily stools, anorexia, weight change, nausea, vomiting, jaundice, fevers, heartburn or extra intestinal manifestations of inflammatory bowel disease.There is no family history of inflammatory bowel disease.She consumes alcohol infrequently.She is not using aspirin or nonsteroidal anti-inflammatory medications.
br
br   1/9/24: I had the pleasure of seeing your patient in the office today for a follow-up visit. She was last seen in the office on October 26, 2023.Capsule endoscopy was performed on November 7, 2023. The study was interpreted by Dr. Pardo as showing no small bowel abnormalities.Lactulose breath test on December 30, 2023 showed an elevated hydrogen peak of 73 ppm (less than 20 ppm) with an elevated methane peak of 18 ppm (less than 10 ppm).She has not yet scheduled the Fibroscan which was recommended at the time of her last visit.She is taking mesalamine 4.8 g once a day.Her appetite is intact and weight remain stable. She is typically moving her bowels two or three times a day with soft stools and occasional loose stools. She cannot identify any specific dietary intolerances. She is not experiencing nausea, vomiting, heartburn, regurgitation, dysphagia, jaundice, fevers, rectal bleeding, melena, mucousy or oily stools.She consumes alcohol infrequently. She does not smoke cigarettes. She is not using aspirin or nonsteroidal anti-inflammatory medications.Abdominal ultrasound on September 18, 2023 reported increased echogenicity of a liver consistent with hepatic steatosis as well as a 2 mm equivocal non-shadowing calculus versus artifact in the right kidney.Laboratory studies from September 12, 2023 as per my previous notes.Fecal calprotectin level on September 19, 2023 was normal at 13.Colonoscopy on July 11, 2023 to the terminal ileum revealed some inflammatory changes of the transverse colon with pathology showing active colitis with acute inflammation and cryptitis. There was also noted to be perhaps some mild inflammation in the region of the ileocecal valve. The remainder of the colonic mucosa as well as terminal ileum appeared normal.There is no family history of inflammatory bowel disease.

## 2024-01-16 ENCOUNTER — OFFICE (OUTPATIENT)
Dept: URBAN - METROPOLITAN AREA CLINIC 95 | Facility: CLINIC | Age: 54
End: 2024-01-16

## 2024-01-16 VITALS — HEIGHT: 63 IN

## 2024-03-01 ENCOUNTER — OFFICE (OUTPATIENT)
Dept: URBAN - METROPOLITAN AREA CLINIC 95 | Facility: CLINIC | Age: 54
End: 2024-03-01

## 2024-03-01 VITALS — HEIGHT: 63 IN

## 2024-03-01 DIAGNOSIS — K76.0 FATTY (CHANGE OF) LIVER, NOT ELSEWHERE CLASSIFIED: ICD-10-CM

## 2024-03-01 PROCEDURE — 91200 LIVER ELASTOGRAPHY: CPT | Performed by: INTERNAL MEDICINE

## 2024-03-13 ENCOUNTER — OFFICE (OUTPATIENT)
Dept: URBAN - METROPOLITAN AREA CLINIC 95 | Facility: CLINIC | Age: 54
End: 2024-03-13

## 2024-03-13 VITALS — HEIGHT: 63 IN

## 2024-03-13 DIAGNOSIS — E78.00 HYPERCHOLESTEREMIA: ICD-10-CM

## 2024-03-13 DIAGNOSIS — K76.0 FATTY (CHANGE OF) LIVER, NOT ELSEWHERE CLASSIFIED: ICD-10-CM

## 2024-03-13 DIAGNOSIS — R14.0 ABDOMINAL BLOATING: ICD-10-CM

## 2024-03-13 DIAGNOSIS — R74.8 ELEVATED LIVER ENZYMES: ICD-10-CM

## 2024-03-13 DIAGNOSIS — R19.7 DIARRHEA (UNSPECIFIED): ICD-10-CM

## 2024-03-13 PROCEDURE — G0406 INPT/TELE FOLLOW UP 15: HCPCS | Performed by: INTERNAL MEDICINE

## 2024-03-13 PROCEDURE — 99214 OFFICE O/P EST MOD 30 MIN: CPT | Performed by: INTERNAL MEDICINE

## 2024-03-13 NOTE — SERVICEHPINOTES
3/9/23: I had the pleasure of seeing your patient in the office today for an initial office visit. She is a 52-year-old female with no past significant history who presents for colon cancer screening. There is no family history of colon polyps, colon cancer, celiac disease or inflammatory bowel disease. She has not previously undergone a lower gastrointestinal tract evaluation.Her appetite is intact. Her weight is stable but does fluctuate 5 to 10 pounds. She is not experiencing nausea, vomiting, regurgitation, dysphagia, jaundice, fevers or pruritus. She has rare episodes of heartburn. There is a five year history of intermittent abdominal bloating and occasional constipation. The bloating does seem to improve with defecation. There is no diarrhea, rectal bleeding, melena or mucus. She typically moves her bowels on a daily basis. She did try MiraLAX for several weeks but is unsure if this provided her with improvement. She is taking probiotics.She has not had recent laboratory studies.She does not smoke cigarettes or use cannabinoid substances. She consumes alcohol infrequently. There is no history of frequent aspirin or nonsteroidal anti-inflammatory medication use.8/3/23: I had the pleasure of seeing your patient in the office today for a follow-up visit. Her initial visit was performed in the office on March 9, 2023.Laboratory studies for March 30, 2023 included a normal C-reactive protein, serum immunoglobulin A level, celiac screen, TSH, CBC and comprehensive metabolic panel with the exception of an elevated ALT 42. Cholesterol was elevated at 216. Triglyceride level was normal at 111.The patient underwent a colonoscopy with Dr. Pardo on July 11, 2023. This examination to the level of the terminal ileum reported a normal terminal ileum. She described an area with "linear ulceration" in the transverse colon with pathology showing active colitis with acute inflammation and cryptitis. There is also noted to be perhaps some mild inflammation around the region of the ileocecal valve.There is no family history of inflammatory bowel disease. She denies the frequent use of aspirin or nonsteroidal anti-inflammatory medications.At the current time she is moving her bowels on a regular basis without constipation or bloating. There is no nausea, vomiting, heartburn, regurgitation, dysphagia, abdominal pain, diarrhea, rectal bleeding, melena, mucousy or extra intestinal manifestations of inflammatory bowel disease.She consumes alcohol infrequently.br10/26/23: I had the pleasure of seeing your patient in the office today for a follow-up visit. She was last seen in the office on August 3, 2023.She is currently taking mesalamine 4.8 g once a day.Abdominal ultrasound from September 18, 2023 reported increased echogenicity of the liver consistent with hepatic steatosis. There is a 2 mm equivocal non-shadowing calculus versus artifact in the right kidney.Fecal calprotectin level on September 19, 2023 was normal at 13.Laboratory studies from September 12, 2023 showed normalization of her liver function tests with a total bilirubin 0.3, direct bilirubin 0.1, AST 22, ALT 22, alkaline phosphatase 64 and albumin 4.6. Additional laboratory studies included a normal INR, creatine kinase, serum immunoglobulin G, serum immunoglobulin M, serum immunoglobulin A, ferritin (45), iron saturation (28%), alpha-1 antitrypsin level, serum ceruloplasmin, antinuclear antibody, antimitochondrial antibody and liver kidney microsomal antibody. Anti-smooth muscle antibody was mildly elevated at 1 to 80.Hepatitis A total antibody was reactive. Hepatitis B surface antigen, hepatitis B core total antibody, hepatitis B surface antibody and hepatitis C antibody were nonreactive.Recall that a previous CBC, C-reactive protein, TSH and celiac screen was normal in March 2023 with a triglyceride level of 111 and elevated cholesterol 216.At the time of her last visit I recommended a lactulose breath test and capsule endoscopy. She has not yet completed the lactulose breath test but intends on completing the study in the near future. We are awaiting authorization for the capsule endoscopy.She is typically moving her bowels three times a day with soft stools without constipation, diarrhea, bloating, rectal bleeding, melena, mucus, oily stools, anorexia, weight change, nausea, vomiting, jaundice, fevers, heartburn or extra intestinal manifestations of inflammatory bowel disease.There is no family history of inflammatory bowel disease.She consumes alcohol infrequently.She is not using aspirin or nonsteroidal anti-inflammatory medications.1/9/24: I had the pleasure of seeing your patient in the office today for a follow-up visit. She was last seen in the office on October 26, 2023.Capsule endoscopy was performed on November 7, 2023. The study was interpreted by Dr. Pardo as showing no small bowel abnormalities.Lactulose breath test on December 30, 2023 showed an elevated hydrogen peak of 73 ppm (less than 20 ppm) with an elevated methane peak of 18 ppm (less than 10 ppm).She has not yet scheduled the Fibroscan which was recommended at the time of her last visit.She is taking mesalamine 4.8 g once a day.Her appetite is intact and weight remain stable. She is typically moving her bowels two or three times a day with soft stools and occasional loose stools. She cannot identify any specific dietary intolerances. She is not experiencing nausea, vomiting, heartburn, regurgitation, dysphagia, jaundice, fevers, rectal bleeding, melena, mucousy or oily stools.She consumes alcohol infrequently. She does not smoke cigarettes. She is not using aspirin or nonsteroidal anti-inflammatory medications.Abdominal ultrasound on September 18, 2023 reported increased echogenicity of a liver consistent with hepatic steatosis as well as a 2 mm equivocal non-shadowing calculus versus artifact in the right kidney.Laboratory studies from September 12, 2023 as per my previous notes.Fecal calprotectin level on September 19, 2023 was normal at 13.Colonoscopy on July 11, 2023 to the terminal ileum revealed some inflammatory changes of the transverse colon with pathology showing active colitis with acute inflammation and cryptitis. There was also noted to be perhaps some mild inflammation in the region of the ileocecal valve. The remainder of the colonic mucosa as well as terminal ileum appeared normal.There is no family history of inflammatory bowel disease.
br
br   3/13/24:  I had the pleasure of performing a telemedicine visit with your patient today. The telemedicine format was utilized secondary to the coronavirus pandemic and subsequent changes. The [employer] platform was used for today's visit. Her last visit was conducted in the office on January 9, 2024.The patient was seen by our nutritionist on January 16, 2024 at which time the low FODMAP diet was reviewed in detail.She was treated with a 10 day course of Augmentin 875 mg – 125 mg twice a day in late January 2024. The Augmentin was used as her prescription plan did not cover the cost of Xifaxan.Fibroscan on March 1, 2024 showed significant steatosis (S3/3) with a CAP score of 337. The kPa score was 5.2 indicating no evidence of increased stiffness/fibrosis.The patient's appetite is intact without weight change. She is not experiencing nausea, vomiting, heartburn, regurgitation, dysphagia, abdominal pain, jaundice, fevers or pruritus. There has been no dramatic change in her intermittent abdominal bloating which tends to occur in a postprandial fashion every 1 to 2 days. She has not yet used the low FODMAP diet but intends on committing to this diet in the near future. She is having three formed bowel movements on a daily basis without diarrhea, constipation, rectal bleeding, melena or mucus.Laboratory studies, abdominal ultrasound, stool studies, capsule endoscopy, lactulose breath test and colonoscopy as per my previous notes.

## 2024-03-13 NOTE — SERVICENOTES
Physical exam was limited as this was a telemedicine visit.
Outpatient telemedicine consult/visit: patient verbalized informed consent to proceed with the telephone/telehealth visit. They confirm they are in the [facility] at the time of this visit.

## 2024-10-16 ENCOUNTER — OFFICE (OUTPATIENT)
Dept: URBAN - METROPOLITAN AREA CLINIC 95 | Facility: CLINIC | Age: 54
End: 2024-10-16

## 2024-10-16 VITALS — HEIGHT: 63 IN

## 2024-10-16 DIAGNOSIS — R19.7 DIARRHEA (UNSPECIFIED): ICD-10-CM

## 2024-10-16 DIAGNOSIS — K76.0 FATTY (CHANGE OF) LIVER, NOT ELSEWHERE CLASSIFIED: ICD-10-CM

## 2024-10-16 DIAGNOSIS — K59.00 CONSTIPATION: ICD-10-CM

## 2024-10-16 DIAGNOSIS — E78.00 HYPERCHOLESTEREMIA: ICD-10-CM

## 2024-10-16 DIAGNOSIS — R74.8 ELEVATED LIVER ENZYMES: ICD-10-CM

## 2024-10-16 DIAGNOSIS — R14.0 ABDOMINAL BLOATING: ICD-10-CM

## 2024-10-16 PROCEDURE — 99214 OFFICE O/P EST MOD 30 MIN: CPT | Performed by: INTERNAL MEDICINE

## 2024-10-16 PROCEDURE — G0406 INPT/TELE FOLLOW UP 15: HCPCS | Performed by: INTERNAL MEDICINE

## 2024-10-16 NOTE — SERVICEHPINOTES
3/9/23: I had the pleasure of seeing your patient in the office today for an initial office visit. She is a 52-year-old female with no past significant history who presents for colon cancer screening. There is no family history of colon polyps, colon cancer, celiac disease or inflammatory bowel disease. She has not previously undergone a lower gastrointestinal tract evaluation.Her appetite is intact. Her weight is stable but does fluctuate 5 to 10 pounds. She is not experiencing nausea, vomiting, regurgitation, dysphagia, jaundice, fevers or pruritus. She has rare episodes of heartburn. There is a five year history of intermittent abdominal bloating and occasional constipation. The bloating does seem to improve with defecation. There is no diarrhea, rectal bleeding, melena or mucus. She typically moves her bowels on a daily basis. She did try MiraLAX for several weeks but is unsure if this provided her with improvement. She is taking probiotics.She has not had recent laboratory studies.She does not smoke cigarettes or use cannabinoid substances. She consumes alcohol infrequently. There is no history of frequent aspirin or nonsteroidal anti-inflammatory medication use.8/3/23: I had the pleasure of seeing your patient in the office today for a follow-up visit. Her initial visit was performed in the office on March 9, 2023.Laboratory studies for March 30, 2023 included a normal C-reactive protein, serum immunoglobulin A level, celiac screen, TSH, CBC and comprehensive metabolic panel with the exception of an elevated ALT 42. Cholesterol was elevated at 216. Triglyceride level was normal at 111.The patient underwent a colonoscopy with Dr. Pardo on July 11, 2023. This examination to the level of the terminal ileum reported a normal terminal ileum. She described an area with "linear ulceration" in the transverse colon with pathology showing active colitis with acute inflammation and cryptitis. There is also noted to be perhaps some mild inflammation around the region of the ileocecal valve.There is no family history of inflammatory bowel disease. She denies the frequent use of aspirin or nonsteroidal anti-inflammatory medications.At the current time she is moving her bowels on a regular basis without constipation or bloating. There is no nausea, vomiting, heartburn, regurgitation, dysphagia, abdominal pain, diarrhea, rectal bleeding, melena, mucousy or extra intestinal manifestations of inflammatory bowel disease.She consumes alcohol infrequently.br10/26/23: I had the pleasure of seeing your patient in the office today for a follow-up visit. She was last seen in the office on August 3, 2023.She is currently taking mesalamine 4.8 g once a day.Abdominal ultrasound from September 18, 2023 reported increased echogenicity of the liver consistent with hepatic steatosis. There is a 2 mm equivocal non-shadowing calculus versus artifact in the right kidney.Fecal calprotectin level on September 19, 2023 was normal at 13.Laboratory studies from September 12, 2023 showed normalization of her liver function tests with a total bilirubin 0.3, direct bilirubin 0.1, AST 22, ALT 22, alkaline phosphatase 64 and albumin 4.6. Additional laboratory studies included a normal INR, creatine kinase, serum immunoglobulin G, serum immunoglobulin M, serum immunoglobulin A, ferritin (45), iron saturation (28%), alpha-1 antitrypsin level, serum ceruloplasmin, antinuclear antibody, antimitochondrial antibody and liver kidney microsomal antibody. Anti-smooth muscle antibody was mildly elevated at 1 to 80.Hepatitis A total antibody was reactive. Hepatitis B surface antigen, hepatitis B core total antibody, hepatitis B surface antibody and hepatitis C antibody were nonreactive.Recall that a previous CBC, C-reactive protein, TSH and celiac screen was normal in March 2023 with a triglyceride level of 111 and elevated cholesterol 216.At the time of her last visit I recommended a lactulose breath test and capsule endoscopy. She has not yet completed the lactulose breath test but intends on completing the study in the near future. We are awaiting authorization for the capsule endoscopy.She is typically moving her bowels three times a day with soft stools without constipation, diarrhea, bloating, rectal bleeding, melena, mucus, oily stools, anorexia, weight change, nausea, vomiting, jaundice, fevers, heartburn or extra intestinal manifestations of inflammatory bowel disease.There is no family history of inflammatory bowel disease.She consumes alcohol infrequently.She is not using aspirin or nonsteroidal anti-inflammatory medications.1/9/24: I had the pleasure of seeing your patient in the office today for a follow-up visit. She was last seen in the office on October 26, 2023.Capsule endoscopy was performed on November 7, 2023. The study was interpreted by Dr. Pardo as showing no small bowel abnormalities.Lactulose breath test on December 30, 2023 showed an elevated hydrogen peak of 73 ppm (less than 20 ppm) with an elevated methane peak of 18 ppm (less than 10 ppm).She has not yet scheduled the Fibroscan which was recommended at the time of her last visit.She is taking mesalamine 4.8 g once a day.Her appetite is intact and weight remain stable. She is typically moving her bowels two or three times a day with soft stools and occasional loose stools. She cannot identify any specific dietary intolerances. She is not experiencing nausea, vomiting, heartburn, regurgitation, dysphagia, jaundice, fevers, rectal bleeding, melena, mucousy or oily stools.She consumes alcohol infrequently. She does not smoke cigarettes. She is not using aspirin or nonsteroidal anti-inflammatory medications.Abdominal ultrasound on September 18, 2023 reported increased echogenicity of a liver consistent with hepatic steatosis as well as a 2 mm equivocal non-shadowing calculus versus artifact in the right kidney.Laboratory studies from September 12, 2023 as per my previous notes.Fecal calprotectin level on September 19, 2023 was normal at 13.Colonoscopy on July 11, 2023 to the terminal ileum revealed some inflammatory changes of the transverse colon with pathology showing active colitis with acute inflammation and cryptitis. There was also noted to be perhaps some mild inflammation in the region of the ileocecal valve. The remainder of the colonic mucosa as well as terminal ileum appeared normal.There is no family history of inflammatory bowel disease.3/13/24: I had the pleasure of performing a telemedicine visit with your patient today. The telemedicine format was utilized secondary to the coronavirus pandemic and subsequent changes. The [employer] platform was used for today's visit. Her last visit was conducted in the office on January 9, 2024.The patient was seen by our nutritionist on January 16, 2024 at which time the low FODMAP diet was reviewed in detail.She was treated with a 10 day course of Augmentin 875 mg – 125 mg twice a day in late January 2024. The Augmentin was used as her prescription plan did not cover the cost of Xifaxan.Fibroscan on March 1, 2024 showed significant steatosis (S3/3) with a CAP score of 337. The kPa score was 5.2 indicating no evidence of increased stiffness/fibrosis.The patient's appetite is intact without weight change. She is not experiencing nausea, vomiting, heartburn, regurgitation, dysphagia, abdominal pain, jaundice, fevers or pruritus. There has been no dramatic change in her intermittent abdominal bloating which tends to occur in a postprandial fashion every 1 to 2 days. She has not yet used the low FODMAP diet but intends on committing to this diet in the near future. She is having three formed bowel movements on a daily basis without diarrhea, constipation, rectal bleeding, melena or mucus.Laboratory studies, abdominal ultrasound, stool studies, capsule endoscopy, lactulose breath test and colonoscopy as per my previous notes.
br
br   10/16/24:  I had the pleasure of performing a telemedicine visit with your patient today. The telemedicine format was utilized secondary to the coronavirus pandemic and subsequent changes. She opted for a telemedicine visit as opposed to an in office visit. The [employer] platform was used for today's visit. Her last visit was a telemedicine visit on March 13, 2024.Laboratory studies performed on June 25, 2024 included a normal TSH, INR, triglyceride level (85), cholesterol (187), serum immunoglobulin G, anti-smooth muscle antibody, vitamin D 25 hydroxy level (33), CBC and basic metabolic panel with the exception of an elevated glucose 114. Liver function tests were normal with a total bilirubin 0.3, AST 19, ALT 22, alkaline phosphatase 63 and albumin 4.6. Hepatitis C RNA quantitative test ordered by her primary care physician was undetectable.Since our last visit she has been compliant with elements of the low FODMAP diet with dramatic improvement. She is now adhering to a low gluten diet and avoiding other specific foods including [place] sprouts and ice cream. She has found that her abdominal bloating has significantly lessened. Her appetite is intact and weight is 154 pounds. She is not experiencing nausea, vomiting, heartburn, regurgitation, dysphagia, abdominal pain, jaundice, fevers or pruritus. She is typically moving her bowels twice a day. She has occasional constipation. She continues taking mesalamine 4.8 g orally once a day. There is no diarrhea, rectal bleeding, melena, mucousy or oily stools.Previous laboratory studies, stool studies, abdominal ultrasound, lactulose breath test, capsule endoscopy, Fibroscan and colonoscopy as per my previous notes.She was previously treated with a course of Augmentin for 10 days (Xifaxan not covered by plan) without significant improvement.

## 2024-10-16 NOTE — SERVICENOTES
Physical exam was limited as this was a telemedicine visit.
Outpatient telemedicine consult/visit: patient verbalized informed consent to proceed with the telephone/telehealth visit. They confirm they are in the [facility] at the time of this visit.

## 2025-02-21 ENCOUNTER — OFFICE (OUTPATIENT)
Dept: URBAN - METROPOLITAN AREA CLINIC 95 | Facility: CLINIC | Age: 55
End: 2025-02-21

## 2025-02-21 VITALS — HEIGHT: 63 IN

## 2025-02-21 DIAGNOSIS — K76.0 FATTY (CHANGE OF) LIVER, NOT ELSEWHERE CLASSIFIED: ICD-10-CM

## 2025-02-21 PROCEDURE — 76981 USE PARENCHYMA: CPT | Performed by: INTERNAL MEDICINE

## 2025-03-04 ENCOUNTER — OFFICE (OUTPATIENT)
Dept: URBAN - METROPOLITAN AREA CLINIC 95 | Facility: CLINIC | Age: 55
End: 2025-03-04

## 2025-03-04 VITALS — HEIGHT: 63 IN

## 2025-03-04 DIAGNOSIS — R14.0 ABDOMINAL BLOATING: ICD-10-CM

## 2025-03-04 DIAGNOSIS — K76.0 FATTY (CHANGE OF) LIVER, NOT ELSEWHERE CLASSIFIED: ICD-10-CM

## 2025-03-04 DIAGNOSIS — R74.8 ELEVATED LIVER ENZYMES: ICD-10-CM

## 2025-03-04 DIAGNOSIS — K59.00 CONSTIPATION: ICD-10-CM

## 2025-03-04 DIAGNOSIS — R19.7 DIARRHEA (UNSPECIFIED): ICD-10-CM

## 2025-03-04 DIAGNOSIS — E78.00 HYPERCHOLESTEREMIA: ICD-10-CM

## 2025-03-04 PROCEDURE — G0406 INPT/TELE FOLLOW UP 15: HCPCS | Performed by: INTERNAL MEDICINE

## 2025-03-04 PROCEDURE — 99214 OFFICE O/P EST MOD 30 MIN: CPT | Mod: 95 | Performed by: INTERNAL MEDICINE

## 2025-03-04 PROCEDURE — G2211 COMPLEX E/M VISIT ADD ON: HCPCS | Performed by: INTERNAL MEDICINE

## 2025-03-04 NOTE — SERVICENOTES
"Pt presents accompanied by family.  He C/O acute onset of palpitations with associated lightheadedness recurring since yesterday. He denies chest pain.  Chief Complaint   Patient presents with   • Palpitations   • Lightheadedness       /106   Pulse 89   Temp 36.6 °C (97.9 °F) (Oral)   Resp 18   Ht 1.803 m (5' 11\")   Wt 98.7 kg (217 lb 9.5 oz)   SpO2 94%   BMI 30.35 kg/m²     " Patient is in [place] and  has provided verbal consent for telehealth visit, understands that their insurance will be billed for services provided today and that they may be responsible for payment of associated copayment and deductibles. Synchronous, audio, and visual communication was achieved for the visit.

## 2025-03-04 NOTE — SERVICEHPINOTES
3/9/23: I had the pleasure of seeing your patient in the office today for an initial office visit. She is a 52-year-old female with no past significant history who presents for colon cancer screening. There is no family history of colon polyps, colon cancer, celiac disease or inflammatory bowel disease. She has not previously undergone a lower gastrointestinal tract evaluation.Her appetite is intact. Her weight is stable but does fluctuate 5 to 10 pounds. She is not experiencing nausea, vomiting, regurgitation, dysphagia, jaundice, fevers or pruritus. She has rare episodes of heartburn. There is a five year history of intermittent abdominal bloating and occasional constipation. The bloating does seem to improve with defecation. There is no diarrhea, rectal bleeding, melena or mucus. She typically moves her bowels on a daily basis. She did try MiraLAX for several weeks but is unsure if this provided her with improvement. She is taking probiotics.She has not had recent laboratory studies.She does not smoke cigarettes or use cannabinoid substances. She consumes alcohol infrequently. There is no history of frequent aspirin or nonsteroidal anti-inflammatory medication use.8/3/23: I had the pleasure of seeing your patient in the office today for a follow-up visit. Her initial visit was performed in the office on March 9, 2023.Laboratory studies for March 30, 2023 included a normal C-reactive protein, serum immunoglobulin A level, celiac screen, TSH, CBC and comprehensive metabolic panel with the exception of an elevated ALT 42. Cholesterol was elevated at 216. Triglyceride level was normal at 111.The patient underwent a colonoscopy with Dr. Pardo on July 11, 2023. This examination to the level of the terminal ileum reported a normal terminal ileum. She described an area with "linear ulceration" in the transverse colon with pathology showing active colitis with acute inflammation and cryptitis. There is also noted to be perhaps some mild inflammation around the region of the ileocecal valve.There is no family history of inflammatory bowel disease. She denies the frequent use of aspirin or nonsteroidal anti-inflammatory medications.At the current time she is moving her bowels on a regular basis without constipation or bloating. There is no nausea, vomiting, heartburn, regurgitation, dysphagia, abdominal pain, diarrhea, rectal bleeding, melena, mucousy or extra intestinal manifestations of inflammatory bowel disease.She consumes alcohol infrequently.br10/26/23: I had the pleasure of seeing your patient in the office today for a follow-up visit. She was last seen in the office on August 3, 2023.She is currently taking mesalamine 4.8 g once a day.Abdominal ultrasound from September 18, 2023 reported increased echogenicity of the liver consistent with hepatic steatosis. There is a 2 mm equivocal non-shadowing calculus versus artifact in the right kidney.Fecal calprotectin level on September 19, 2023 was normal at 13.Laboratory studies from September 12, 2023 showed normalization of her liver function tests with a total bilirubin 0.3, direct bilirubin 0.1, AST 22, ALT 22, alkaline phosphatase 64 and albumin 4.6. Additional laboratory studies included a normal INR, creatine kinase, serum immunoglobulin G, serum immunoglobulin M, serum immunoglobulin A, ferritin (45), iron saturation (28%), alpha-1 antitrypsin level, serum ceruloplasmin, antinuclear antibody, antimitochondrial antibody and liver kidney microsomal antibody. Anti-smooth muscle antibody was mildly elevated at 1 to 80.Hepatitis A total antibody was reactive. Hepatitis B surface antigen, hepatitis B core total antibody, hepatitis B surface antibody and hepatitis C antibody were nonreactive.Recall that a previous CBC, C-reactive protein, TSH and celiac screen was normal in March 2023 with a triglyceride level of 111 and elevated cholesterol 216.At the time of her last visit I recommended a lactulose breath test and capsule endoscopy. She has not yet completed the lactulose breath test but intends on completing the study in the near future. We are awaiting authorization for the capsule endoscopy.She is typically moving her bowels three times a day with soft stools without constipation, diarrhea, bloating, rectal bleeding, melena, mucus, oily stools, anorexia, weight change, nausea, vomiting, jaundice, fevers, heartburn or extra intestinal manifestations of inflammatory bowel disease.There is no family history of inflammatory bowel disease.She consumes alcohol infrequently.She is not using aspirin or nonsteroidal anti-inflammatory medications.1/9/24: I had the pleasure of seeing your patient in the office today for a follow-up visit. She was last seen in the office on October 26, 2023.Capsule endoscopy was performed on November 7, 2023. The study was interpreted by Dr. Pardo as showing no small bowel abnormalities.Lactulose breath test on December 30, 2023 showed an elevated hydrogen peak of 73 ppm (less than 20 ppm) with an elevated methane peak of 18 ppm (less than 10 ppm).She has not yet scheduled the Fibroscan which was recommended at the time of her last visit.She is taking mesalamine 4.8 g once a day.Her appetite is intact and weight remain stable. She is typically moving her bowels two or three times a day with soft stools and occasional loose stools. She cannot identify any specific dietary intolerances. She is not experiencing nausea, vomiting, heartburn, regurgitation, dysphagia, jaundice, fevers, rectal bleeding, melena, mucousy or oily stools.She consumes alcohol infrequently. She does not smoke cigarettes. She is not using aspirin or nonsteroidal anti-inflammatory medications.Abdominal ultrasound on September 18, 2023 reported increased echogenicity of a liver consistent with hepatic steatosis as well as a 2 mm equivocal non-shadowing calculus versus artifact in the right kidney.Laboratory studies from September 12, 2023 as per my previous notes.Fecal calprotectin level on September 19, 2023 was normal at 13.Colonoscopy on July 11, 2023 to the terminal ileum revealed some inflammatory changes of the transverse colon with pathology showing active colitis with acute inflammation and cryptitis. There was also noted to be perhaps some mild inflammation in the region of the ileocecal valve. The remainder of the colonic mucosa as well as terminal ileum appeared normal.There is no family history of inflammatory bowel disease.3/13/24: I had the pleasure of performing a telemedicine visit with your patient today. The telemedicine format was utilized secondary to the coronavirus pandemic and subsequent changes. The [employer] platform was used for today's visit. Her last visit was conducted in the office on January 9, 2024.The patient was seen by our nutritionist on January 16, 2024 at which time the low FODMAP diet was reviewed in detail.She was treated with a 10 day course of Augmentin 875 mg – 125 mg twice a day in late January 2024. The Augmentin was used as her prescription plan did not cover the cost of Xifaxan.Fibroscan on March 1, 2024 showed significant steatosis (S3/3) with a CAP score of 337. The kPa score was 5.2 indicating no evidence of increased stiffness/fibrosis.The patient's appetite is intact without weight change. She is not experiencing nausea, vomiting, heartburn, regurgitation, dysphagia, abdominal pain, jaundice, fevers or pruritus. There has been no dramatic change in her intermittent abdominal bloating which tends to occur in a postprandial fashion every 1 to 2 days. She has not yet used the low FODMAP diet but intends on committing to this diet in the near future. She is having three formed bowel movements on a daily basis without diarrhea, constipation, rectal bleeding, melena or mucus.Laboratory studies, abdominal ultrasound, stool studies, capsule endoscopy, lactulose breath test and colonoscopy as per my previous notes.10/16/24: I had the pleasure of performing a telemedicine visit with your patient today. The telemedicine format was utilized secondary to the coronavirus pandemic and subsequent changes. She opted for a telemedicine visit as opposed to an in office visit. The [employer] platform was used for today's visit. Her last visit was a telemedicine visit on March 13, 2024.Laboratory studies performed on June 25, 2024 included a normal TSH, INR, triglyceride level (85), cholesterol (187), serum immunoglobulin G, anti-smooth muscle antibody, vitamin D 25 hydroxy level (33), CBC and basic metabolic panel with the exception of an elevated glucose 114. Liver function tests were normal with a total bilirubin 0.3, AST 19, ALT 22, alkaline phosphatase 63 and albumin 4.6. Hepatitis C RNA quantitative test ordered by her primary care physician was undetectable.Since our last visit she has been compliant with elements of the low FODMAP diet with dramatic improvement. She is now adhering to a low gluten diet and avoiding other specific foods including [place] sprouts and ice cream. She has found that her abdominal bloating has significantly lessened. Her appetite is intact and weight is 154 pounds. She is not experiencing nausea, vomiting, heartburn, regurgitation, dysphagia, abdominal pain, jaundice, fevers or pruritus. She is typically moving her bowels twice a day. She has occasional constipation. She continues taking mesalamine 4.8 g orally once a day. There is no diarrhea, rectal bleeding, melena, mucousy or oily stools.Previous laboratory studies, stool studies, abdominal ultrasound, lactulose breath test, capsule endoscopy, Fibroscan and colonoscopy as per my previous notes.She was previously treated with a course of Augmentin for 10 days (Xifaxan not covered by plan) without significant improvement.
br
br   3/4/25:  I had the pleasure of performing a telemedicine visit with your patient today. The telemedicine format was utilized secondary to the coronavirus pandemic and subsequent changes. She opted for a telemedicine visit as opposed to an in office visit. The [employer] platform was used for today's visit. Her last visit was a telemedicine visit on October 16, 2024.Fibroscan on February 21, 2025 showed no evidence of increased steatosis (S0) with a CAP score of 216. The kPa score was 4.3 indicating no evidence of increased stiffness/fibrosis. Previous scans including March 1, 2024 showed significant steatosis (S3/3).Colonoscopy performed by Dr. Pardo on January 9, 2025 to the level of the terminal ileum revealed some mild edema within the terminal ileum with no other mucosal abnormalities. The previously described mild inflammatory changes noted by Dr. Pardo in the transverse colon and ileocecal valve on July 11, 2023 had completely resolved. Biopsies taken from the ascending colon, transverse, descending, sigmoid and rectum showed no histologic evidence of active inflammation or dysplasia.The patient's appetite is intact and weight remains stable at 154 pounds which correlates to a BMI of 27.28. She is not experiencing nausea, vomiting, heartburn, regurgitation, dysphagia, jaundice, fevers, pruritus or abdominal pain. She still notes occasional episodes of abdominal bloating but these are much more likely to occur when eating outside the home. She generally adheres to elements of the low FODMAP diet including low gluten and also tends to avoid dairy products and [place] sprouts. She typically moves her bowels between one and three times a day without constipation, diarrhea, rectal bleeding, melena or mucus.
vinny Krishnamurthy would like to schedule another appointment with the nutritionist. Additional evaluation of laboratory studies, stool studies, celiac screen, capsule endoscopy, lactulose breath test and previous colonoscopy as per my prior notes.

## 2025-03-25 ENCOUNTER — OFFICE (OUTPATIENT)
Dept: URBAN - METROPOLITAN AREA CLINIC 99 | Facility: CLINIC | Age: 55
End: 2025-03-25

## 2025-03-25 VITALS — HEIGHT: 63 IN
